# Patient Record
Sex: FEMALE | URBAN - METROPOLITAN AREA
[De-identification: names, ages, dates, MRNs, and addresses within clinical notes are randomized per-mention and may not be internally consistent; named-entity substitution may affect disease eponyms.]

---

## 2019-07-17 ENCOUNTER — APPOINTMENT (RX ONLY)
Dept: URBAN - METROPOLITAN AREA CLINIC 48 | Facility: CLINIC | Age: 79
Setting detail: DERMATOLOGY
End: 2019-07-17

## 2019-07-17 DIAGNOSIS — L50.1 IDIOPATHIC URTICARIA: ICD-10-CM

## 2019-07-17 DIAGNOSIS — L259 CONTACT DERMATITIS AND OTHER ECZEMA, UNSPECIFIED CAUSE: ICD-10-CM | Status: RESOLVING

## 2019-07-17 PROBLEM — H91.90 UNSPECIFIED HEARING LOSS, UNSPECIFIED EAR: Status: ACTIVE | Noted: 2019-07-17

## 2019-07-17 PROBLEM — L23.9 ALLERGIC CONTACT DERMATITIS, UNSPECIFIED CAUSE: Status: ACTIVE | Noted: 2019-07-17

## 2019-07-17 PROBLEM — E03.9 HYPOTHYROIDISM, UNSPECIFIED: Status: ACTIVE | Noted: 2019-07-17

## 2019-07-17 PROCEDURE — ? TREATMENT REGIMEN

## 2019-07-17 PROCEDURE — ? COUNSELING

## 2019-07-17 PROCEDURE — 99202 OFFICE O/P NEW SF 15 MIN: CPT

## 2019-07-17 ASSESSMENT — LOCATION DETAILED DESCRIPTION DERM
LOCATION DETAILED: LEFT SUPERIOR PARIETAL SCALP
LOCATION DETAILED: MID POSTERIOR NECK
LOCATION DETAILED: RIGHT INFERIOR POSTAURICULAR SKIN
LOCATION DETAILED: LEFT LATERAL SUPERIOR EYELID
LOCATION DETAILED: LEFT SUPERIOR VERMILION LIP
LOCATION DETAILED: RIGHT LATERAL SUPERIOR EYELID
LOCATION DETAILED: LEFT INFERIOR POSTAURICULAR SKIN

## 2019-07-17 ASSESSMENT — LOCATION ZONE DERM
LOCATION ZONE: SCALP
LOCATION ZONE: LIP
LOCATION ZONE: NECK
LOCATION ZONE: EYELID

## 2019-07-17 ASSESSMENT — SEVERITY ASSESSMENT: SEVERITY: ALMOST CLEAR

## 2019-07-17 ASSESSMENT — BSA RASH: BSA RASH: 2

## 2019-07-17 ASSESSMENT — LOCATION SIMPLE DESCRIPTION DERM
LOCATION SIMPLE: LEFT LIP
LOCATION SIMPLE: LEFT SUPERIOR EYELID
LOCATION SIMPLE: RIGHT SUPERIOR EYELID
LOCATION SIMPLE: POSTERIOR NECK
LOCATION SIMPLE: SCALP

## 2019-07-17 ASSESSMENT — PAIN INTENSITY VAS: HOW INTENSE IS YOUR PAIN 0 BEING NO PAIN, 10 BEING THE MOST SEVERE PAIN POSSIBLE?: NO PAIN

## 2019-07-17 NOTE — PROCEDURE: TREATMENT REGIMEN
Plan: Minimal findings on today's exam. Patient to return for urgent appointment if there is future flaring as this would warrant re-evaluation.
Detail Level: Zone
Plan: Discussed possible option of modified antihistamine regimen vs. Xolair. Patient to return for re-evaluation when there is active flaring so that I may confirm diagnosis before initiating further treatment.

## 2019-12-16 ENCOUNTER — APPOINTMENT (RX ONLY)
Dept: URBAN - METROPOLITAN AREA CLINIC 48 | Facility: CLINIC | Age: 79
Setting detail: DERMATOLOGY
End: 2019-12-16

## 2019-12-16 DIAGNOSIS — L50.1 IDIOPATHIC URTICARIA: ICD-10-CM | Status: RESOLVING

## 2019-12-16 PROCEDURE — ? COUNSELING

## 2019-12-16 PROCEDURE — 99213 OFFICE O/P EST LOW 20 MIN: CPT

## 2019-12-16 PROCEDURE — ? PRESCRIPTION

## 2019-12-16 PROCEDURE — ? TREATMENT REGIMEN

## 2019-12-16 RX ORDER — EPINEPHRINE 0.3 MG/.3ML
INJECTION INTRAMUSCULAR
Qty: 1 | Refills: 2 | Status: ERX | COMMUNITY
Start: 2019-12-16

## 2019-12-16 RX ADMIN — EPINEPHRINE: 0.3 INJECTION INTRAMUSCULAR at 00:00

## 2019-12-16 ASSESSMENT — LOCATION SIMPLE DESCRIPTION DERM
LOCATION SIMPLE: RIGHT POSTERIOR UPPER ARM
LOCATION SIMPLE: CHEST
LOCATION SIMPLE: LEFT POSTERIOR UPPER ARM
LOCATION SIMPLE: ABDOMEN
LOCATION SIMPLE: RIGHT UPPER BACK
LOCATION SIMPLE: LEFT THIGH
LOCATION SIMPLE: RIGHT THIGH

## 2019-12-16 ASSESSMENT — LOCATION DETAILED DESCRIPTION DERM
LOCATION DETAILED: MIDDLE STERNUM
LOCATION DETAILED: LEFT ANTERIOR PROXIMAL THIGH
LOCATION DETAILED: PERIUMBILICAL SKIN
LOCATION DETAILED: RIGHT ANTERIOR PROXIMAL THIGH
LOCATION DETAILED: RIGHT PROXIMAL POSTERIOR UPPER ARM
LOCATION DETAILED: RIGHT MEDIAL UPPER BACK
LOCATION DETAILED: LEFT DISTAL POSTERIOR UPPER ARM

## 2019-12-16 ASSESSMENT — LOCATION ZONE DERM
LOCATION ZONE: LEG
LOCATION ZONE: ARM
LOCATION ZONE: TRUNK

## 2019-12-16 NOTE — PROCEDURE: TREATMENT REGIMEN
Plan: Continue Methylprednisolone taper as prescribed by PCP. Start Fexofenadine (Allegra) 180mg qa.m. and Zyrtec 5-10mg q p.m. Discussed possible future option of Xolair injections if not adequately controlled with antihistamine regimen. I discussed risk of sedation with antihistamine use (patient advised on not driving when taking and caution with walking at night to avoid falls). I also discussed that control of urticaria may take future modification of antihistamine regimen. Epipen prescribed in chance of anaphylaxis. Patient to return in 4 weeks for f/u.
Detail Level: Zone

## 2020-01-14 ENCOUNTER — APPOINTMENT (RX ONLY)
Dept: URBAN - METROPOLITAN AREA CLINIC 48 | Facility: CLINIC | Age: 80
Setting detail: DERMATOLOGY
End: 2020-01-14

## 2020-01-14 DIAGNOSIS — L50.1 IDIOPATHIC URTICARIA: ICD-10-CM | Status: RESOLVED

## 2020-01-14 PROCEDURE — ? COUNSELING

## 2020-01-14 PROCEDURE — 99213 OFFICE O/P EST LOW 20 MIN: CPT

## 2020-01-14 PROCEDURE — ? TREATMENT REGIMEN

## 2020-01-14 NOTE — PROCEDURE: TREATMENT REGIMEN
Plan: Patient will attempt to start tapering Fexofenadine (Allegra) 180mg qa.m. and Zyrtec 5-10mg q p.m as tolerated.  I reviewed risk of sedation with antihistamine use (patient advised on not driving when taking and caution with walking at night to avoid falls). I also discussed that control of urticaria may take future modification of antihistamine regimen. Epipen prescribed in chance of anaphylaxis.
Detail Level: Zone